# Patient Record
Sex: FEMALE | Race: WHITE | Employment: FULL TIME | URBAN - METROPOLITAN AREA
[De-identification: names, ages, dates, MRNs, and addresses within clinical notes are randomized per-mention and may not be internally consistent; named-entity substitution may affect disease eponyms.]

---

## 2021-08-10 ENCOUNTER — SEE NOTE (OUTPATIENT)
Dept: URBAN - METROPOLITAN AREA CLINIC 36 | Facility: CLINIC | Age: 41
Setting detail: DERMATOLOGY
End: 2021-08-10

## 2021-08-10 DIAGNOSIS — L90.5 SCAR CONDITIONS AND FIBROSIS OF SKIN: ICD-10-CM

## 2021-08-10 PROCEDURE — 99203 OFFICE O/P NEW LOW 30 MIN: CPT

## 2025-02-13 ENCOUNTER — LAB OUTSIDE AN ENCOUNTER (OUTPATIENT)
Dept: URBAN - METROPOLITAN AREA CLINIC 92 | Facility: CLINIC | Age: 45
End: 2025-02-13

## 2025-02-13 ENCOUNTER — DASHBOARD ENCOUNTERS (OUTPATIENT)
Age: 45
End: 2025-02-13

## 2025-02-13 ENCOUNTER — OFFICE VISIT (OUTPATIENT)
Dept: URBAN - METROPOLITAN AREA CLINIC 92 | Facility: CLINIC | Age: 45
End: 2025-02-13
Payer: COMMERCIAL

## 2025-02-13 VITALS
WEIGHT: 188 LBS | DIASTOLIC BLOOD PRESSURE: 86 MMHG | HEART RATE: 86 BPM | TEMPERATURE: 97.9 F | SYSTOLIC BLOOD PRESSURE: 138 MMHG | BODY MASS INDEX: 30.22 KG/M2 | HEIGHT: 66 IN

## 2025-02-13 DIAGNOSIS — K59.04 CHRONIC IDIOPATHIC CONSTIPATION: ICD-10-CM

## 2025-02-13 DIAGNOSIS — R10.84 GENERALIZED ABDOMINAL PAIN: ICD-10-CM

## 2025-02-13 DIAGNOSIS — Z12.11 COLON CANCER SCREENING: ICD-10-CM

## 2025-02-13 PROBLEM — 82934008: Status: ACTIVE | Noted: 2025-02-13

## 2025-02-13 PROCEDURE — 99204 OFFICE O/P NEW MOD 45 MIN: CPT | Performed by: INTERNAL MEDICINE

## 2025-02-13 RX ORDER — VALACYCLOVIR 1 G/1
TAKE 1 TABLET BY MOUTH THREE TIMES A DAY FOR 15 DAYS TABLET, FILM COATED ORAL
Qty: 45 EACH | Refills: 0 | Status: ON HOLD | COMMUNITY

## 2025-02-13 RX ORDER — ONDANSETRON HYDROCHLORIDE 4 MG/1
2 TABLETS TABLET, FILM COATED ORAL
Qty: 2 TABLETS | Refills: 0 | OUTPATIENT
Start: 2025-02-13

## 2025-02-13 RX ORDER — LEVOTHYROXINE SODIUM 0.07 MG/1
TABLET ORAL
Qty: 90 EACH | Refills: 5 | Status: ON HOLD | COMMUNITY

## 2025-02-13 RX ORDER — LEVOTHYROXINE SODIUM 75 UG/1
TABLET ORAL
Qty: 90 TABLET | Status: ACTIVE | COMMUNITY

## 2025-02-13 RX ORDER — POLYETHYLENE GLYCOL 3350, SODIUM CHLORIDE, SODIUM BICARBONATE, POTASSIUM CHLORIDE 420; 11.2; 5.72; 1.48 G/4L; G/4L; G/4L; G/4L
AS DIRECTED POWDER, FOR SOLUTION ORAL AS DIRECTED
Qty: 4000 ML | Refills: 0 | OUTPATIENT
Start: 2025-02-13 | End: 2025-02-14

## 2025-02-13 NOTE — HPI-TODAY'S VISIT:
43yo fem ref by Di Quiros for a GI cosult and a copy of this note will be sent to the ref provider. Patient had a colonoscopy done at [facility] on December 20, 2009 for heme positive stool, left-sided pain and constipation.  This showed undigested food and stool in the right colon that was unable to be cleansed completely.  The rest of the exam looked normal.  She was given Demerol and Versed and had hives and was given Benadryl for that. Pt goes mutiple times a day, does not consider herself as constipated. Pts gyn thinks she may have endometriosis. Pt has had left sided pain for many yrs. NO abd surgeries. No fam hx of colon ca. Pt in school for social work for hospice.

## 2025-02-20 ENCOUNTER — TELEPHONE ENCOUNTER (OUTPATIENT)
Dept: URBAN - METROPOLITAN AREA CLINIC 96 | Facility: CLINIC | Age: 45
End: 2025-02-20

## 2025-02-24 ENCOUNTER — WEB ENCOUNTER (OUTPATIENT)
Dept: URBAN - METROPOLITAN AREA CLINIC 92 | Facility: CLINIC | Age: 45
End: 2025-02-24

## 2025-03-25 ENCOUNTER — WEB ENCOUNTER (OUTPATIENT)
Dept: URBAN - METROPOLITAN AREA CLINIC 92 | Facility: CLINIC | Age: 45
End: 2025-03-25

## 2025-03-25 RX ORDER — POLYETHYLENE GLYCOL 3350, SODIUM CHLORIDE, SODIUM BICARBONATE, POTASSIUM CHLORIDE 420; 11.2; 5.72; 1.48 G/4L; G/4L; G/4L; G/4L
AS DIRECTED POWDER, FOR SOLUTION ORAL AS DIRECTED
Qty: 4000 ML | Refills: 0 | OUTPATIENT
Start: 2025-03-26 | End: 2025-03-27

## 2025-03-25 RX ORDER — ONDANSETRON HYDROCHLORIDE 4 MG/1
2 TABLETS TABLET, FILM COATED ORAL
Qty: 2 TABLETS | Refills: 0 | OUTPATIENT
Start: 2025-03-26

## 2025-03-27 ENCOUNTER — OFFICE VISIT (OUTPATIENT)
Dept: URBAN - METROPOLITAN AREA SURGERY CENTER 16 | Facility: SURGERY CENTER | Age: 45
End: 2025-03-27

## 2025-03-27 RX ORDER — ONDANSETRON HYDROCHLORIDE 4 MG/1
2 TABLETS TABLET, FILM COATED ORAL
Qty: 2 TABLETS | Refills: 0 | Status: ACTIVE | COMMUNITY
Start: 2025-03-26

## 2025-03-27 RX ORDER — LEVOTHYROXINE SODIUM 0.07 MG/1
TABLET ORAL
Qty: 90 EACH | Refills: 5 | Status: ON HOLD | COMMUNITY

## 2025-03-27 RX ORDER — POLYETHYLENE GLYCOL 3350, SODIUM CHLORIDE, SODIUM BICARBONATE, POTASSIUM CHLORIDE 420; 11.2; 5.72; 1.48 G/4L; G/4L; G/4L; G/4L
AS DIRECTED POWDER, FOR SOLUTION ORAL AS DIRECTED
Qty: 4000 ML | Refills: 0 | Status: ACTIVE | COMMUNITY
Start: 2025-03-26 | End: 2025-03-27

## 2025-03-27 RX ORDER — LEVOTHYROXINE SODIUM 75 UG/1
TABLET ORAL
Qty: 90 TABLET | Status: ACTIVE | COMMUNITY

## 2025-03-27 RX ORDER — VALACYCLOVIR 1 G/1
TAKE 1 TABLET BY MOUTH THREE TIMES A DAY FOR 15 DAYS TABLET, FILM COATED ORAL
Qty: 45 EACH | Refills: 0 | Status: ON HOLD | COMMUNITY

## 2025-03-27 RX ORDER — ONDANSETRON HYDROCHLORIDE 4 MG/1
2 TABLETS TABLET, FILM COATED ORAL
Qty: 2 TABLETS | Refills: 0 | Status: ACTIVE | COMMUNITY
Start: 2025-02-13

## 2025-04-02 ENCOUNTER — OFFICE VISIT (OUTPATIENT)
Dept: URBAN - METROPOLITAN AREA CLINIC 86 | Facility: CLINIC | Age: 45
End: 2025-04-02
Payer: COMMERCIAL

## 2025-04-02 ENCOUNTER — LAB OUTSIDE AN ENCOUNTER (OUTPATIENT)
Dept: URBAN - METROPOLITAN AREA CLINIC 86 | Facility: CLINIC | Age: 45
End: 2025-04-02

## 2025-04-02 DIAGNOSIS — Z71.89 VACCINE COUNSELING: ICD-10-CM

## 2025-04-02 DIAGNOSIS — R10.84 GENERALIZED ABDOMINAL PAIN: ICD-10-CM

## 2025-04-02 DIAGNOSIS — K59.04 CHRONIC IDIOPATHIC CONSTIPATION: ICD-10-CM

## 2025-04-02 DIAGNOSIS — K76.0 HEPATIC STEATOSIS: ICD-10-CM

## 2025-04-02 DIAGNOSIS — E03.9 ADULT HYPOTHYROIDISM: ICD-10-CM

## 2025-04-02 PROBLEM — 102614006: Status: ACTIVE | Noted: 2025-04-02

## 2025-04-02 PROCEDURE — 99214 OFFICE O/P EST MOD 30 MIN: CPT | Performed by: PHYSICIAN ASSISTANT

## 2025-04-02 RX ORDER — ONDANSETRON HYDROCHLORIDE 4 MG/1
2 TABLETS TABLET, FILM COATED ORAL
Qty: 2 TABLETS | Refills: 0 | Status: ACTIVE | COMMUNITY
Start: 2025-02-13

## 2025-04-02 RX ORDER — ONDANSETRON HYDROCHLORIDE 4 MG/1
2 TABLETS TABLET, FILM COATED ORAL
Qty: 2 TABLETS | Refills: 0 | Status: ACTIVE | COMMUNITY
Start: 2025-03-26

## 2025-04-02 RX ORDER — VALACYCLOVIR 1 G/1
TAKE 1 TABLET BY MOUTH THREE TIMES A DAY FOR 15 DAYS TABLET, FILM COATED ORAL
Qty: 45 EACH | Refills: 0 | Status: ON HOLD | COMMUNITY

## 2025-04-02 RX ORDER — LEVOTHYROXINE SODIUM 0.07 MG/1
TABLET ORAL
Qty: 90 EACH | Refills: 5 | Status: ON HOLD | COMMUNITY

## 2025-04-02 RX ORDER — LEVOTHYROXINE SODIUM 75 UG/1
TABLET ORAL
Qty: 90 TABLET | Status: ACTIVE | COMMUNITY

## 2025-04-02 NOTE — HPI-TODAY'S VISIT:
43 yo female who was seeing Dr. Barbosa for her GI issues and here to day for follow up regarding fatty liver that was seen on a ct scan.   PMH includes hyothyroidism, endometriosis, abdominal pain.  ct 2/2205 Precontrast images demonstrate there is diffuse low attenuation of the liver most consistent with hepaticsteatosis. The gallbladder is unremarkable. There are no calcified gallstones. There is no intra orextrahepatic biliary dilatation.  She has been workin on fatty liver and lost about 20 lbs. She is reducing the meats.  She does have avocado, and that is the only.  vegan A lot of walking.  She does not consume etoh. Prior was a heavier drinker.

## 2025-04-04 ENCOUNTER — TELEPHONE ENCOUNTER (OUTPATIENT)
Dept: URBAN - METROPOLITAN AREA CLINIC 86 | Facility: CLINIC | Age: 45
End: 2025-04-04

## 2025-04-04 LAB
A/G RATIO: 1.7
ABSOLUTE BASOPHILS: 29
ABSOLUTE EOSINOPHILS: 80
ABSOLUTE LYMPHOCYTES: 1511
ABSOLUTE MONOCYTES: 251
ABSOLUTE NEUTROPHILS: 3830
ALBUMIN: 4.3
ALKALINE PHOSPHATASE: 64
ALT (SGPT): 16
AST (SGOT): 14
BASOPHILS: 0.5
BILIRUBIN, TOTAL: 0.3
BUN/CREATININE RATIO: (no result)
BUN: 7
CALCIUM: 9.8
CARBON DIOXIDE, TOTAL: 28
CHLORIDE: 104
CREATININE: 0.62
EGFR: 112
EOSINOPHILS: 1.4
GLOBULIN, TOTAL: 2.5
GLUCOSE: 91
HEMATOCRIT: 36.8
HEMOGLOBIN: 12
HEP A AB, IGM: (no result)
HEPATITIS A AB, TOTAL: REACTIVE
HEPATITIS B SURFACE AB IMMUNITY, QN: <5
LYMPHOCYTES: 26.5
MCH: 28.5
MCHC: 32.6
MCV: 87.4
MONOCYTES: 4.4
MPV: 9.8
NEUTROPHILS: 67.2
PLATELET COUNT: 391
POTASSIUM: 4.6
PROTEIN, TOTAL: 6.8
RDW: 12.7
RED BLOOD CELL COUNT: 4.21
SODIUM: 139
WHITE BLOOD CELL COUNT: 5.7

## 2025-04-04 NOTE — HPI-TODAY'S VISIT:
Renate Comer,   Labs were sent to me.  Your hepatitis B immunity was not seen.  You can discuss this vaccine with the primary care.  You do have immunity to hepatitis A.  The creatinine 0.62, sodium 139, potassium 4 6, bilirubin 03, alkaline phosphatase 64, AST 14 and ALT 16. Goal for the ast and alt is less than 25. Happy these are normal. The white blood cells 5.7, hemoglobin 12.0, MCV 87 platelets 391.  These are normal.  We will see what the FibroScan shows.  Abbey Kulkarni PA-C

## 2025-04-11 ENCOUNTER — CLAIMS CREATED FROM THE CLAIM WINDOW (OUTPATIENT)
Dept: URBAN - METROPOLITAN AREA CLINIC 117 | Facility: CLINIC | Age: 45
End: 2025-04-11
Payer: COMMERCIAL

## 2025-04-11 ENCOUNTER — WEB ENCOUNTER (OUTPATIENT)
Dept: URBAN - METROPOLITAN AREA CLINIC 86 | Facility: CLINIC | Age: 45
End: 2025-04-11

## 2025-04-11 ENCOUNTER — OFFICE VISIT (OUTPATIENT)
Dept: URBAN - METROPOLITAN AREA CLINIC 85 | Facility: CLINIC | Age: 45
End: 2025-04-11
Payer: COMMERCIAL

## 2025-04-11 DIAGNOSIS — K76.0 HEPATIC STEATOSIS: ICD-10-CM

## 2025-04-11 PROCEDURE — 76981 USE PARENCHYMA: CPT | Performed by: INTERNAL MEDICINE

## 2025-04-11 RX ORDER — LEVOTHYROXINE SODIUM 75 UG/1
TABLET ORAL
Qty: 90 TABLET | Status: ACTIVE | COMMUNITY

## 2025-04-11 RX ORDER — ONDANSETRON HYDROCHLORIDE 4 MG/1
2 TABLETS TABLET, FILM COATED ORAL
Qty: 2 TABLETS | Refills: 0 | Status: ACTIVE | COMMUNITY
Start: 2025-02-13

## 2025-04-11 RX ORDER — LEVOTHYROXINE SODIUM 0.07 MG/1
TABLET ORAL
Qty: 90 EACH | Refills: 5 | Status: ON HOLD | COMMUNITY

## 2025-04-11 RX ORDER — VALACYCLOVIR 1 G/1
TAKE 1 TABLET BY MOUTH THREE TIMES A DAY FOR 15 DAYS TABLET, FILM COATED ORAL
Qty: 45 EACH | Refills: 0 | Status: ON HOLD | COMMUNITY

## 2025-04-11 RX ORDER — ONDANSETRON HYDROCHLORIDE 4 MG/1
2 TABLETS TABLET, FILM COATED ORAL
Qty: 2 TABLETS | Refills: 0 | Status: ACTIVE | COMMUNITY
Start: 2025-03-26

## 2025-04-17 ENCOUNTER — TELEPHONE ENCOUNTER (OUTPATIENT)
Dept: URBAN - METROPOLITAN AREA CLINIC 86 | Facility: CLINIC | Age: 45
End: 2025-04-17

## 2025-04-17 NOTE — HPI-TODAY'S VISIT:
Renate Comer,   The recent FibroScan was sent to me.  I am happy to report that they do not see any fibrosis at 3.7 kPa.  They noted that you have moderate fatty liver and the CAP score was 270 which is consistent with moderate fatty liver.  Continue to work on the things that you have been doing and this will improve over time.  Abbey Kulkarni PA-C

## 2025-04-21 ENCOUNTER — OFFICE VISIT (OUTPATIENT)
Dept: URBAN - METROPOLITAN AREA TELEHEALTH 2 | Facility: TELEHEALTH | Age: 45
End: 2025-04-21

## 2025-04-21 ENCOUNTER — OFFICE VISIT (OUTPATIENT)
Dept: URBAN - METROPOLITAN AREA TELEHEALTH 2 | Facility: TELEHEALTH | Age: 45
End: 2025-04-21
Payer: COMMERCIAL

## 2025-04-21 DIAGNOSIS — K76.0 FATTY LIVER: ICD-10-CM

## 2025-04-21 DIAGNOSIS — K57.90 DIVERTICULOSIS: ICD-10-CM

## 2025-04-21 DIAGNOSIS — N80.9 ENDOMETRIOSIS: ICD-10-CM

## 2025-04-21 DIAGNOSIS — K59.04 CHRONIC IDIOPATHIC CONSTIPATION: ICD-10-CM

## 2025-04-21 PROBLEM — 129103003: Status: ACTIVE | Noted: 2025-04-21

## 2025-04-21 PROBLEM — 1260086007: Status: ACTIVE | Noted: 2025-04-21

## 2025-04-21 PROBLEM — 397881000: Status: ACTIVE | Noted: 2025-04-21

## 2025-04-21 PROBLEM — 197321007: Status: ACTIVE | Noted: 2025-04-21

## 2025-04-21 PROCEDURE — 99204 OFFICE O/P NEW MOD 45 MIN: CPT | Performed by: INTERNAL MEDICINE

## 2025-04-21 RX ORDER — VALACYCLOVIR 1 G/1
TAKE 1 TABLET BY MOUTH THREE TIMES A DAY FOR 15 DAYS TABLET, FILM COATED ORAL
Qty: 45 EACH | Refills: 0 | Status: ON HOLD | COMMUNITY

## 2025-04-21 RX ORDER — LEVOTHYROXINE SODIUM 0.07 MG/1
TABLET ORAL
Qty: 90 EACH | Refills: 5 | Status: ON HOLD | COMMUNITY

## 2025-04-21 RX ORDER — LEVOTHYROXINE SODIUM 75 UG/1
TABLET ORAL
Qty: 90 TABLET | Status: ACTIVE | COMMUNITY

## 2025-04-21 RX ORDER — ONDANSETRON HYDROCHLORIDE 4 MG/1
2 TABLETS TABLET, FILM COATED ORAL
Qty: 2 TABLETS | Refills: 0 | Status: ON HOLD | COMMUNITY
Start: 2025-02-13

## 2025-04-21 RX ORDER — ONDANSETRON HYDROCHLORIDE 4 MG/1
2 TABLETS TABLET, FILM COATED ORAL
Qty: 2 TABLETS | Refills: 0 | Status: ACTIVE | COMMUNITY
Start: 2025-02-13

## 2025-04-21 RX ORDER — LEVOTHYROXINE SODIUM 0.07 MG/1
TABLET ORAL
Qty: 90 EACH | Refills: 5 | Status: ACTIVE | COMMUNITY

## 2025-04-21 NOTE — HPI-TODAY'S VISIT:
43yo fem ref by Di Quiros for a GI fu and a copy of this note will be sent to the ref provider. Patient had a colonoscopy done at [facility] on December 20, 2009 for heme positive stool, left-sided pain and constipation.  This showed undigested food and stool in the right colon that was unable to be cleansed completely.  The rest of the exam looked normal.  She was given Demerol and Versed and had hives and was given Benadryl for that. Pt goes mutiple times a day, does not consider herself as constipated. Pts gyn thinks she may have endometriosis. Pt has had left sided pain for many yrs. NO abd surgeries. No fam hx of colon ca. Pt in school for social work for hospice.  Todays visit Pt here for fu. Has been seen at [facility] [place] for fatty liver. Patient had a colonoscopy on March 27, 2025 by Dr. RICHTER.  This revealed a few left colonic diverticula and small internal hemorrhoids otherwise normal exam. Pt did a 2 day prep for this exam. Prior to that she had a CT scan on February 20, 2025 for abdominal pain and this revealed fatty liver and some nonspecific stranding between the sigmoid colon and posterior area of the uterus and left and right adnexa.  Findings are nonspecific.  The radiologist stated this could be seen in the setting of endometriosis.

## 2025-06-04 ENCOUNTER — OFFICE VISIT (OUTPATIENT)
Dept: URBAN - METROPOLITAN AREA CLINIC 86 | Facility: CLINIC | Age: 45
End: 2025-06-04
Payer: COMMERCIAL

## 2025-06-04 DIAGNOSIS — K76.0 HEPATIC STEATOSIS: ICD-10-CM

## 2025-06-04 DIAGNOSIS — Z71.89 VACCINE COUNSELING: ICD-10-CM

## 2025-06-04 DIAGNOSIS — E03.9 ADULT HYPOTHYROIDISM: ICD-10-CM

## 2025-06-04 DIAGNOSIS — K59.04 CHRONIC IDIOPATHIC CONSTIPATION: ICD-10-CM

## 2025-06-04 DIAGNOSIS — R10.84 GENERALIZED ABDOMINAL PAIN: ICD-10-CM

## 2025-06-04 PROCEDURE — 99214 OFFICE O/P EST MOD 30 MIN: CPT | Performed by: PHYSICIAN ASSISTANT

## 2025-06-04 RX ORDER — ONDANSETRON HYDROCHLORIDE 4 MG/1
2 TABLETS TABLET, FILM COATED ORAL
Qty: 2 TABLETS | Refills: 0 | Status: ON HOLD | COMMUNITY
Start: 2025-03-26

## 2025-06-04 RX ORDER — LEVOTHYROXINE SODIUM 75 UG/1
TABLET ORAL
Qty: 90 TABLET | Status: ACTIVE | COMMUNITY

## 2025-06-04 RX ORDER — ONDANSETRON HYDROCHLORIDE 4 MG/1
2 TABLETS TABLET, FILM COATED ORAL
Qty: 2 TABLETS | Refills: 0 | Status: ON HOLD | COMMUNITY
Start: 2025-02-13

## 2025-06-04 RX ORDER — VALACYCLOVIR 1 G/1
TAKE 1 TABLET BY MOUTH THREE TIMES A DAY FOR 15 DAYS TABLET, FILM COATED ORAL
Qty: 45 EACH | Refills: 0 | Status: ON HOLD | COMMUNITY

## 2025-06-04 NOTE — HPI-TODAY'S VISIT:
43 yo female who was seeing Dr. Barbosa for her GI issues and here to day for follow up regarding fatty liver that was seen on a ct scan.   6/4/25 ov  The recent FibroScan was sent to me. I am happy to report that they do not see any fibrosis at 3.7 kPa. They noted that you have moderate fatty liver and the CAP score was 270 which is consistent with moderate fatty liver. Continue to work on the things that you have been doing and this will improve over time.  CHINTAN Marshall,   Labs were sent to me. Your hepatitis B immunity was not seen. You can discuss this vaccine with the primary care. You do have immunity to hepatitis A. The creatinine 0.62, sodium 139, potassium 4 6, bilirubin 03, alkaline phosphatase 64, AST 14 and ALT 16. Goal for the ast and alt is less than 25. Happy these are normal. The white blood cells 5.7, hemoglobin 12.0, MCV 87 platelets 391. These are normal. We will see what the FibroScan shows.  168 so weight stable   goal weight around 150  walking less   recap PMH includes hyothyroidism, endometriosis, abdominal pain.  ct 2/2205 Precontrast images demonstrate there is diffuse low attenuation of the liver most consistent with hepaticsteatosis. The gallbladder is unremarkable. There are no calcified gallstones. There is no intra orextrahepatic biliary dilatation.  She has been workin on fatty liver and lost about 20 lbs. She is reducing the meats.  She does have avocado, and that is the only.  vegan A lot of walking.  She does not consume etoh. Prior was a heavier drinker.

## 2025-06-04 NOTE — PHYSICAL EXAM CONSTITUTIONAL:
pleasant, well nourished, well developed, in no acute distress , normal communication ability
Detail Level: Detailed
Detail Level: Simple

## 2025-07-18 ENCOUNTER — WEB ENCOUNTER (OUTPATIENT)
Dept: URBAN - METROPOLITAN AREA CLINIC 86 | Facility: CLINIC | Age: 45
End: 2025-07-18